# Patient Record
(demographics unavailable — no encounter records)

---

## 2017-03-24 NOTE — PHYS DOC
Past History


Past Medical History:  Anxiety, Asthma, Other


Past Surgical History:  Tonsillectomy


Alcohol Use:  None


Drug Use:  None





Adult General


Chief Complaint


Chief Complaint:  FOOT INJURY PAIN





HPI


HPI


30 years old female patient  at 28 weeks of gestation states she had a 

syncopal episode 3 days ago and landed on his left foot. Patient denies hitting 

her head or other injuries. Patient complaining of pain in the left foot that 

did not get better with over-the-counter Tylenol





Review of Systems


Review of Systems





Constitutional: Denies fever or chills []


Eyes: Denies change in visual acuity, redness, or eye pain []


HENT: Denies nasal congestion or sore throat []


Respiratory: Denies cough or shortness of breath []


Cardiovascular: No additional information not addressed in HPI []


GI: Denies abdominal pain, nausea, vomiting, bloody stools or diarrhea [

currently pregnant]


: Denies dysuria or hematuria []


Musculoskeletal: see HPI


Integument: Denies rash or skin lesions []


Neurologic: Denies headache, focal weakness or sensory changes []


Endocrine: Denies polyuria or polydipsia []





Current Medications


Current Medications





 Current Medications








 Medications


  (Trade)  Dose


 Ordered  Sig/Torres  Start Time


 Stop Time Status Last Admin


Dose Admin


 


 Acetaminophen/


 Codeine Phosphate


  (Tylenol #3)  1 tab  1X  ONCE  3/24/17 09:45


 3/24/17 09:46 DC 3/24/17 09:17


1 TAB











Allergies


Allergies





 Allergies








Coded Allergies Type Severity Reaction Last Updated Verified


 


  No Known Drug Allergies    3/24/17 No











Physical Exam


Physical Exam





Constitutional: Well developed, well nourished, mild distress, non-toxic 

appearance. []


HENT: Normocephalic, atraumatic, bilateral external ears normal, oropharynx 

moist, no oral exudates, nose normal. []


Eyes: PERRLA, EOMI, conjunctiva normal, no discharge. [] 


Neck: Normal range of motion, no tenderness, supple, no stridor. [] 


Cardiovascular:Heart rate regular rhythm, no murmur []


Lungs & Thorax:  Bilateral breath sounds clear to auscultation []


Abdomen: Bowel sounds normal, soft, no tenderness, no masses, no pulsatile 

masses. [[id number]] 


Skin: Warm, dry, no erythema, no rash. [] 


Back: No tenderness, no CVA tenderness. [] 


Extremities: Left fourth feet edema and tenderness without ecchymoses


Neurologic: Alert and oriented X 3, normal motor function, normal sensory 

function, no focal deficits noted. []


Psychologic: Affect normal, judgement normal, mood normal. []





Current Patient Data


Vital Signs





 Vital Signs








  Date Time  Temp Pulse Resp B/P Pulse Ox O2 Delivery O2 Flow Rate FiO2


 


3/24/17 09:17   18     


 


3/24/17 09:05 97.6 94   98 Room Air  











EKG


EKG


[]





Radiology/Procedures


Radiology/Procedures


[Left foot x ray did not show fracture





Course & Med Decision Making


Course & Med Decision Making


Pertinent Imaging studies reviewed. (See chart for details)





[Evaluation of patient showed 30  year old patient at 28 weeks of gestation 

presented to ER  with a syncopal episode and fall 3 days ago] with injury to 

left foot. Patient has unremarkable physical exam except for left foot edema. X 

ray was unremarkable. Plan discharge patient home with diagnosis of foot 

sprain.Ace wrap applied.





Dragon Disclaimer


Dragon Disclaimer


This chart was dictated in whole or in part using Voice Recognition software in 

a busy, high-work load, and often noisy Emergency Department environment.  It 

may contain unintended and wholly unrecognized errors or omissions.





Departure


Departure:


Impression:  


 Primary Impression:  


 Sprain of foot, left


 Additional Impression:  


 Currently pregnant


Disposition:  01 HOME, SELF-CARE (at 1000)


Condition:  IMPROVED


Patient Instructions:  Foot Contusion





Additional Instructions:


Apply ice on your foot 


Follow up with your doctor regarding syncopal episode


Scripts


Acetaminophen With Codeine (Tylenol With Codeine #3 Tablet)1 Each Tablet1 Tab 

PO Q6HRS #20 TAB


   Prov:MANUEL TORO MD         3/24/17





Problem Qualifiers








MANUEL TORO MD Mar 24, 2017 10:03

## 2017-03-24 NOTE — RAD
Left foot, 3 views, 3/24/2017:



History: Foot swelling and bruising, injury



No fracture or dislocation is identified. There is mild spurring at the

talonavicular articulation. There is mild diffuse soft tissue swelling about

the foot.



IMPRESSION: No acute bony abnormality is detected.